# Patient Record
(demographics unavailable — no encounter records)

---

## 2025-03-07 NOTE — HISTORY OF PRESENT ILLNESS
[FreeTextEntry1] : 52 year old female with SLE since 2024 when she noted fatigue and pain in hands and shoulders +hair loss w strands on the pillow,; sent to rheumatology and told of SLE and started on hcq 400mg.  She has continued to be monitored.  Last flare in January, flares characterized by leg cramps, fingers achy and swollen.  Previous flare in November,  Treated with steroids for ~7day.  Flares characterized by. joint pain/swelling/stiffness.   R/S, +dry eyes (uses artificial tears bid), notes "dryness" of mouth.  No serositis, renal, hepatic, cytopenia, miscarriages, migraines (severe headaches occur but no aura, nausea, vomiting), malar rash, but hyperpigmentation around eyes, no photosensitivity, ulcers, Raynauds, thyroid disease, fever,  anorexia, weight loss, lymphadenopathy, chills, , oral ulcers, fatigue (sleeps ~5-6 hrs/night, but still fatigued if sleeping 8 hrs), chest pain, shortness of breath, cough, nausea, vomiting, diarrhea, abdominal pain (except with diverticulits flare).  FH: no autoimmune disease, 2 brother (1  from gastric? ulcer), 3 children in good health Surg: ?hole in bottom of right eye;, hysterectomy , tubal ligation  Meds: plaquenil 400mg  (last optho );  Allergy: penicillin--rash PMH: asthma (x ~age 28), diverticulitis (-- constipated;-- advised to change diet); GERD- omeprazole; HTN (7 years ago, not treated)  PtGA 2.7  PE: /92  202.2 lbs

## 2025-03-07 NOTE — ASSESSMENT
[FreeTextEntry1] : SLE-- arthralgias and ?cutaneous lesions on lower extremities.  She will take a photo if these reoccur.  For now will check labs, will request medical records, will refill hcq, omeprazole and baclofen.  She will use diclofenac gel on right CMC; she will need a letter stating regarding her lupus for upcoming eye surgery.  HTN:  She will monitor BP at home; will follow

## 2025-03-07 NOTE — PHYSICAL EXAM
[General Appearance - Alert] : alert [General Appearance - In No Acute Distress] : in no acute distress [Sclera] : the sclera and conjunctiva were normal [Extraocular Movements] : extraocular movements were intact [Outer Ear] : the ears and nose were normal in appearance [Nasal Cavity] : the nasal mucosa and septum were normal [Neck Appearance] : the appearance of the neck was normal [Exaggerated Use Of Accessory Muscles For Inspiration] : no accessory muscle use [Auscultation Breath Sounds / Voice Sounds] : lungs were clear to auscultation bilaterally [Heart Rate And Rhythm] : heart rate was normal and rhythm regular [Heart Sounds] : normal S1 and S2 [Heart Sounds Gallop] : no gallops [Murmurs] : no murmurs [Heart Sounds Pericardial Friction Rub] : no pericardial rub [Edema] : there was no peripheral edema [Cervical Lymph Nodes Enlarged Posterior Bilaterally] : posterior cervical [Cervical Lymph Nodes Enlarged Anterior Bilaterally] : anterior cervical [Supraclavicular Lymph Nodes Enlarged Bilaterally] : supraclavicular [No CVA Tenderness] : no ~M costovertebral angle tenderness [No Spinal Tenderness] : no spinal tenderness [Musculoskeletal - Swelling] : no joint swelling seen [No Focal Deficits] : no focal deficits [FreeTextEntry1] : lower extremities with scatterd circular hyperpigmented ~3cm lesion- some with central ?punctate, macular dry/hyperkeratocic patches on upper arms, anterior and posterior chest with hyperpigmentation without erythema

## 2025-06-13 NOTE — HISTORY OF PRESENT ILLNESS
[FreeTextEntry1] : 52 year old female with SLE since 2024 when she noted fatigue and pain in hands and shoulders +hair loss w strands on the pillow,; sent to rheumatology and told of SLE and started on hcq 400mg.  She has continued to be monitored.  Last flare in January, flares characterized by leg cramps, fingers achy and swollen.  Previous flare in November,  Treated with steroids for ~7day.  Flares characterized by. joint pain/swelling/stiffness.   R/S, +dry eyes (uses artificial tears bid), notes "dryness" of mouth.  No serositis, renal, hepatic, cytopenia, miscarriages, migraines (severe headaches occur but no aura, nausea, vomiting), malar rash, but hyperpigmentation around eyes, no photosensitivity, ulcers, Raynauds, thyroid disease, fever,  anorexia, weight loss, lymphadenopathy, chills, , oral ulcers, fatigue (sleeps ~5-6 hrs/night, but still fatigued if sleeping 8 hrs), chest pain, shortness of breath, cough, nausea, vomiting, diarrhea, abdominal pain (except with diverticulits flare).  +cataracts  25 PtGA 1.5 f/u SLE- noting pain in her arms with heaviness and occasional feeling of pins and needles.  Rt CMC and 5th MCPs often are tender.  No swelling, no morning stiffness.  She has been applying diclofenac gel bid (morning and prior to sleep).  Continues on hcq (bid-- occasionally misses a dose). Fatigue continues.   No fever, rash, anorexia, weight loss, alopecia, lymphadenopathy, chills, joint pain/swelling/stiffness, oral ulcers, dry eyes, chest pain, shortness of breath, cough, edema, nausea, vomiting, diarrhea, abdominal pain. She is scheduled for cataract surgery in May.  25 PtGA 0 f/u SLE-- has continued to experience "flares" of her disease-- has been prescribed steroids by pcp as well as rheumatology (in [place]) and in EDs-- she recieved a 5 d course from her pcp when she presented with fatigue, head ache, generalized achiness and swelling of her feet. She responded to the prednisone.  This is likely to be her 5th episode in the past year.  She additionally notes scaly hyperpigmented rash on upper arms (usually responsive to moisturizers) Since then, she has had right knee pain which is worse with standing.  She is followed by her pcp and is undergoing a work-up for tachycardia.  She has had no fever,  anorexia, weight loss, alopecia, lymphadenopathy, chills,, oral ulcers, dry eyes, chest pain, shortness of breath, cough, anosmia, vomiting, diarrhea, abdominal pain.  She does not have the lab results from her pcp with her (but can forward them from home)  Meds: plaquenil 400mg  (last optho ); B12 pills. (She additionally has folate pills at home) FH: no autoimmune disease, 2 brother (1  from gastric? ulcer), 3 children in good health Surg: ?hole in bottom of right eye;, hysterectomy , tubal ligation   Allergy: penicillin--rash PMH: asthma (x ~age 28), diverticulitis (-- constipated;-- advised to change diet); GERD- omeprazole; HTN (7 years ago, not treated)  PtGA 1.5  PE: /81  201 lbs

## 2025-06-13 NOTE — ASSESSMENT
[FreeTextEntry1] : SLE-- arthralgias and ?cutaneous lesions on lower extremities.  Will check labs, awaiting medical records including recent labs from her pcp,

## 2025-06-13 NOTE — PHYSICAL EXAM
[General Appearance - Alert] : alert [General Appearance - In No Acute Distress] : in no acute distress [Sclera] : the sclera and conjunctiva were normal [Extraocular Movements] : extraocular movements were intact [Outer Ear] : the ears and nose were normal in appearance [Nasal Cavity] : the nasal mucosa and septum were normal [Neck Appearance] : the appearance of the neck was normal [Exaggerated Use Of Accessory Muscles For Inspiration] : no accessory muscle use [Auscultation Breath Sounds / Voice Sounds] : lungs were clear to auscultation bilaterally [Heart Rate And Rhythm] : heart rate was normal and rhythm regular [Heart Sounds] : normal S1 and S2 [Heart Sounds Gallop] : no gallops [Murmurs] : no murmurs [Heart Sounds Pericardial Friction Rub] : no pericardial rub [Edema] : there was no peripheral edema [Cervical Lymph Nodes Enlarged Posterior Bilaterally] : posterior cervical [Cervical Lymph Nodes Enlarged Anterior Bilaterally] : anterior cervical [Supraclavicular Lymph Nodes Enlarged Bilaterally] : supraclavicular [No CVA Tenderness] : no ~M costovertebral angle tenderness [No Spinal Tenderness] : no spinal tenderness [Musculoskeletal - Swelling] : no joint swelling seen [FreeTextEntry1] : lower extremities with scatterd circular hyperpigmented ~3cm lesion- some with central ?punctate, macular dry/hyperkeratocic patches on upper arms, anterior and posterior chest with hyperpigmentation without erythema  [No Focal Deficits] : no focal deficits

## 2025-07-25 NOTE — ASSESSMENT
[FreeTextEntry1] : SLE-- no "active" cutaneous lesions -- unclear etiology from dermatology-- however, have resolved with picrolimus.  She will contact me if reoccurance.  Have contacted dermatology, will check labs, awaiting medical records,

## 2025-07-25 NOTE — PHYSICAL EXAM
[General Appearance - Alert] : alert [General Appearance - In No Acute Distress] : in no acute distress [Sclera] : the sclera and conjunctiva were normal [Extraocular Movements] : extraocular movements were intact [Outer Ear] : the ears and nose were normal in appearance [Nasal Cavity] : the nasal mucosa and septum were normal [Neck Appearance] : the appearance of the neck was normal [Exaggerated Use Of Accessory Muscles For Inspiration] : no accessory muscle use [Auscultation Breath Sounds / Voice Sounds] : lungs were clear to auscultation bilaterally [Heart Sounds] : normal S1 and S2 [Heart Rate And Rhythm] : heart rate was normal and rhythm regular [Heart Sounds Gallop] : no gallops [Murmurs] : no murmurs [Heart Sounds Pericardial Friction Rub] : no pericardial rub [Edema] : there was no peripheral edema [Cervical Lymph Nodes Enlarged Posterior Bilaterally] : posterior cervical [Cervical Lymph Nodes Enlarged Anterior Bilaterally] : anterior cervical [Supraclavicular Lymph Nodes Enlarged Bilaterally] : supraclavicular [No CVA Tenderness] : no ~M costovertebral angle tenderness [No Spinal Tenderness] : no spinal tenderness [Musculoskeletal - Swelling] : no joint swelling seen [No Focal Deficits] : no focal deficits [FreeTextEntry1] : macular areas of hypopigmentation on bilateral upper extremities; lower extremities with scatterd circular hyperpigmented ~3cm lesion- some with central ?punctate, macular erythema

## 2025-07-25 NOTE — HISTORY OF PRESENT ILLNESS
[FreeTextEntry1] : 52 year old female with SLE since 2024 when she noted fatigue and pain in hands and shoulders +hair loss w strands on the pillow,; sent to rheumatology and told of SLE and started on hcq 400mg.  She has continued to be monitored.  Last flare in January, flares characterized by leg cramps, fingers achy and swollen.  Previous flare in November,  Treated with steroids for ~7day.  Flares characterized by. joint pain/swelling/stiffness.   R/S, +dry eyes (uses artificial tears bid), notes "dryness" of mouth.  No serositis, renal, hepatic, cytopenia, miscarriages, migraines (severe headaches occur but no aura, nausea, vomiting), malar rash, but hyperpigmentation around eyes, no photosensitivity, ulcers, Raynauds, thyroid disease, fever,  anorexia, weight loss, lymphadenopathy, chills, , oral ulcers, fatigue (sleeps ~5-6 hrs/night, but still fatigued if sleeping 8 hrs), chest pain, shortness of breath, cough, nausea, vomiting, diarrhea, abdominal pain (except with diverticulits flare).  +cataracts  25 PtGA 1.5 f/u SLE- noting pain in her arms with heaviness and occasional feeling of pins and needles.  Rt CMC and 5th MCPs often are tender.  No swelling, no morning stiffness.  She has been applying diclofenac gel bid (morning and prior to sleep).  Continues on hcq (bid-- occasionally misses a dose). Fatigue continues.   No fever, rash, anorexia, weight loss, alopecia, lymphadenopathy, chills, joint pain/swelling/stiffness, oral ulcers, dry eyes, chest pain, shortness of breath, cough, edema, nausea, vomiting, diarrhea, abdominal pain. She is scheduled for cataract surgery in May.  25 PtGA 0 f/u SLE-- has continued to experience "flares" of her disease-- has been prescribed steroids by pcp as well as rheumatology (in [place]) and in EDs-- she recieved a 5 d course from her pcp when she presented with fatigue, head ache, generalized achiness and swelling of her feet. She responded to the prednisone.  This is likely to be her 5th episode in the past year.  She additionally notes scaly hyperpigmented rash on upper arms (usually responsive to moisturizers) Since then, she has had right knee pain which is worse with standing.  She is followed by her pcp and is undergoing a work-up for tachycardia.  She has had no fever,  anorexia, weight loss, alopecia, lymphadenopathy, chills,, oral ulcers, dry eyes, chest pain, shortness of breath, cough, anosmia, vomiting, diarrhea, abdominal pain.  She does not have the lab results from her pcp with her (but can forward them from home)  2025 PtGA 0 f/u SLE, feeling well and has not received prednisone since last visit;  she had a rash in bilateral arms, she saw dermatology Dr Reza in [facility]:  [phone] who prescribed tacrolimus and ammonium lactate with resolution.  No fever, rash, anorexia, weight loss, alopecia, lymphadenopathy, chills, joint pain/swelling/stiffness, oral ulcers, fatigue, dry eyes, chest pain, shortness of breath, cough,, nausea, vomiting, diarrhea, abdominal pain, headaches, edema. Her pain of left 5th MCP has resolved; as well as the  dry/hyperkeratocic patches on upper arms, anterior and posterior chest.  No change in right knee pain (mechanical).  Of note, she has undergone cataract surgery   Meds: plaquenil 400mg  (last optho ); B12 pills. (She additionally has folate pills at home) FH: no autoimmune disease, 2 brother (1  from gastric? ulcer), 3 children in good health Surg: ?hole in bottom of right eye;, hysterectomy , tubal ligation   Allergy: penicillin--rash PMH: asthma (x ~age 28), diverticulitis (-- constipated;-- advised to change diet); GERD- omeprazole; HTN (7 years ago, not treated)  PtGA 1.5  PE: /81  201 lbs